# Patient Record
Sex: MALE | Race: WHITE
[De-identification: names, ages, dates, MRNs, and addresses within clinical notes are randomized per-mention and may not be internally consistent; named-entity substitution may affect disease eponyms.]

---

## 2018-03-27 ENCOUNTER — HOSPITAL ENCOUNTER (OUTPATIENT)
Dept: HOSPITAL 96 - M.ERS | Age: 53
Setting detail: OBSERVATION
LOS: 1 days | Discharge: HOME | End: 2018-03-28
Attending: SURGERY | Admitting: SURGERY
Payer: COMMERCIAL

## 2018-03-27 VITALS — WEIGHT: 165 LBS | BODY MASS INDEX: 25.01 KG/M2 | HEIGHT: 67.99 IN

## 2018-03-27 VITALS — DIASTOLIC BLOOD PRESSURE: 91 MMHG | SYSTOLIC BLOOD PRESSURE: 152 MMHG

## 2018-03-27 VITALS — DIASTOLIC BLOOD PRESSURE: 64 MMHG | SYSTOLIC BLOOD PRESSURE: 107 MMHG

## 2018-03-27 VITALS — SYSTOLIC BLOOD PRESSURE: 114 MMHG | DIASTOLIC BLOOD PRESSURE: 74 MMHG

## 2018-03-27 DIAGNOSIS — R10.9: ICD-10-CM

## 2018-03-27 DIAGNOSIS — R06.02: ICD-10-CM

## 2018-03-27 DIAGNOSIS — D72.829: ICD-10-CM

## 2018-03-27 DIAGNOSIS — R11.2: ICD-10-CM

## 2018-03-27 DIAGNOSIS — K56.51: Primary | ICD-10-CM

## 2018-03-27 DIAGNOSIS — E86.0: ICD-10-CM

## 2018-03-27 LAB
ABSOLUTE BASOPHILS: 0.1 THOU/UL (ref 0–0.2)
ABSOLUTE EOSINOPHILS: 0 THOU/UL (ref 0–0.7)
ABSOLUTE MONOCYTES: 0.7 THOU/UL (ref 0–1.2)
ALBUMIN SERPL-MCNC: 4 G/DL (ref 3.4–5)
ALP SERPL-CCNC: 79 U/L (ref 46–116)
ALT SERPL-CCNC: 36 U/L (ref 30–65)
ANION GAP SERPL CALC-SCNC: 8 MMOL/L (ref 7–16)
AST SERPL-CCNC: 23 U/L (ref 15–37)
BASOPHILS NFR BLD AUTO: 0.5 %
BILIRUB SERPL-MCNC: 0.7 MG/DL
BILIRUB UR-MCNC: NEGATIVE MG/DL
BUN SERPL-MCNC: 11 MG/DL (ref 7–18)
CALCIUM SERPL-MCNC: 9.3 MG/DL (ref 8.5–10.1)
CHLORIDE SERPL-SCNC: 104 MMOL/L (ref 98–107)
CO2 SERPL-SCNC: 30 MMOL/L (ref 21–32)
COLOR UR: (no result)
CREAT SERPL-MCNC: 1.2 MG/DL (ref 0.6–1.3)
EOSINOPHIL NFR BLD: 0.2 %
GLUCOSE SERPL-MCNC: 112 MG/DL (ref 70–99)
GRANULOCYTES NFR BLD MANUAL: 82.4 %
HCT VFR BLD CALC: 47.8 % (ref 42–52)
HGB BLD-MCNC: 17 GM/DL (ref 14–18)
KETONES UR STRIP-MCNC: (no result) MG/DL
LIPASE: 138 U/L (ref 73–393)
LYMPHOCYTES # BLD: 1.6 THOU/UL (ref 0.8–5.3)
LYMPHOCYTES NFR BLD AUTO: 11.8 %
MCH RBC QN AUTO: 36.2 PG (ref 26–34)
MCHC RBC AUTO-ENTMCNC: 35.5 G/DL (ref 28–37)
MCV RBC: 101.9 FL (ref 80–100)
MONOCYTES NFR BLD: 5.1 %
MPV: 8.5 FL. (ref 7.2–11.1)
NEUTROPHILS # BLD: 11.2 THOU/UL (ref 1.6–8.1)
NUCLEATED RBCS: 0 /100WBC
PLATELET COUNT*: 237 THOU/UL (ref 150–400)
POTASSIUM SERPL-SCNC: 3.6 MMOL/L (ref 3.5–5.1)
PROT SERPL-MCNC: 7.8 G/DL (ref 6.4–8.2)
PROT UR QL STRIP: (no result)
RBC # BLD AUTO: 4.69 MIL/UL (ref 4.5–6)
RBC # UR STRIP: NEGATIVE /UL
RDW-CV: 12.7 % (ref 10.5–14.5)
SODIUM SERPL-SCNC: 142 MMOL/L (ref 136–145)
SP GR UR STRIP: 1.01 (ref 1–1.03)
TROPONIN-I LEVEL: <0.06 NG/ML (ref ?–0.06)
URINE CLARITY: CLEAR
URINE GLUCOSE-RANDOM: NEGATIVE
URINE LEUKOCYTES-REFLEX: NEGATIVE
URINE NITRITE-REFLEX: NEGATIVE
UROBILINOGEN UR STRIP-ACNC: 0.2 E.U./DL (ref 0.2–1)
WBC # BLD AUTO: 13.6 THOU/UL (ref 4–11)

## 2018-03-27 NOTE — NUR
PATIENT ARRIVED TO UNIT AT 1800. ALERT AND ORIENTED X4. UP AD PETRA IN ROOM. IV
IS PATENT AND INFUSING. PAIN BEING MANAGED WITH MEDICATION GIVEN IN THE ER AND
SCHEDULED TORDOL. DENIES NAUSEA AT THIS TIME. PATIENT HAS BEEN ORIENTED TO
ROOM. VSS ON ROOM AIR. HOURLY ROUNDS HAVE BEEN MAINTAINED THROUGHOUT SHIFT.
CALL LIGHT IS WITHIN REACH. NURSING WILL CONTINUE TO MONITOR.

## 2018-03-28 VITALS — DIASTOLIC BLOOD PRESSURE: 66 MMHG | SYSTOLIC BLOOD PRESSURE: 111 MMHG

## 2018-03-28 VITALS — DIASTOLIC BLOOD PRESSURE: 68 MMHG | SYSTOLIC BLOOD PRESSURE: 109 MMHG

## 2018-03-28 LAB
ABSOLUTE BASOPHILS: 0.1 THOU/UL (ref 0–0.2)
ABSOLUTE EOSINOPHILS: 0.1 THOU/UL (ref 0–0.7)
ABSOLUTE MONOCYTES: 0.8 THOU/UL (ref 0–1.2)
ANION GAP SERPL CALC-SCNC: 9 MMOL/L (ref 7–16)
BASOPHILS NFR BLD AUTO: 0.9 %
BUN SERPL-MCNC: 10 MG/DL (ref 7–18)
CALCIUM SERPL-MCNC: 8.2 MG/DL (ref 8.5–10.1)
CHLORIDE SERPL-SCNC: 110 MMOL/L (ref 98–107)
CO2 SERPL-SCNC: 26 MMOL/L (ref 21–32)
CREAT SERPL-MCNC: 1.1 MG/DL (ref 0.6–1.3)
EOSINOPHIL NFR BLD: 1.1 %
GLUCOSE SERPL-MCNC: 95 MG/DL (ref 70–99)
GRANULOCYTES NFR BLD MANUAL: 51.5 %
HCT VFR BLD CALC: 40.7 % (ref 42–52)
HGB BLD-MCNC: 14.4 GM/DL (ref 14–18)
LYMPHOCYTES # BLD: 2.7 THOU/UL (ref 0.8–5.3)
LYMPHOCYTES NFR BLD AUTO: 35.9 %
MCH RBC QN AUTO: 35.8 PG (ref 26–34)
MCHC RBC AUTO-ENTMCNC: 35.3 G/DL (ref 28–37)
MCV RBC: 101.4 FL (ref 80–100)
MONOCYTES NFR BLD: 10.6 %
MPV: 8.8 FL. (ref 7.2–11.1)
NEUTROPHILS # BLD: 3.8 THOU/UL (ref 1.6–8.1)
NUCLEATED RBCS: 0 /100WBC
PLATELET COUNT*: 189 THOU/UL (ref 150–400)
POTASSIUM SERPL-SCNC: 4 MMOL/L (ref 3.5–5.1)
RBC # BLD AUTO: 4.01 MIL/UL (ref 4.5–6)
RDW-CV: 12.3 % (ref 10.5–14.5)
SODIUM SERPL-SCNC: 145 MMOL/L (ref 136–145)
WBC # BLD AUTO: 7.5 THOU/UL (ref 4–11)

## 2018-03-28 NOTE — NUR
ASSUMED CARE IF APTIENT AFTER MORNING REPORT. ALERT AND ORIENTED X4.
ASSESSMENT COMPLETED AND AS CHARTED. VSS ON ROOM AIR. PATIENT HAS HAD NO
COMPLAINTS OF PAIN OR NAUSEA THIS SHIFT. FLUIDS INFUSING AS ORDERED. PATIENT
ADVANCED FROM NPO TO CLEAR LIQUIDS AND TOLERATED THIS WELL. PATIENT IS
DISCHARGING ON A CLEAR LIQUID DIET AND IS ADVISED TO SLOWLY ADVANCE DIET AS
TOLERATED. PATIENT DISCHARGED AT 1510. ALL PERSONAL BELONGINGS LEFT WITH
PATIENT. DISCHARGE INSTRUCTION SEN WITH PATIENT UPON DISCHARGE.

## 2018-03-28 NOTE — NUR
PATIENT ALERT AND ORIENTED THROUGHOUT SHIFT. VITAL SIGNS STABLE ON ROOM AIR.
DECLINED SCD'S AND SHAI HOSE STATING HE WOULD GET UP AND WALK INSTEAD.
MAINTAINED NPO STATUS THROUGHOUT SHIFT. PATIENT DECLINED SCHEDULED PAIN
MEDICATION AT MIDNIGHT STATING HE WAS NOT HAVING PAIN AT THIS TIME. DENIED
NAUSEA AND OR VOMITING. IV PATENT IN THE RIGHT AC AND RUNNING AT 75 ML/HR.
RESTING COMFORTABLY AT THIS TIME. CALL LIGHT WITHIN REACH. WILL CALL FOR
ASSISTANCE.  NURSING WILL CONTINUE TO MONITOR.

## 2018-05-02 ENCOUNTER — HOSPITAL ENCOUNTER (INPATIENT)
Dept: HOSPITAL 96 - M.ERS | Age: 53
LOS: 1 days | Discharge: HOME | DRG: 389 | End: 2018-05-03
Attending: COLON & RECTAL SURGERY | Admitting: COLON & RECTAL SURGERY
Payer: COMMERCIAL

## 2018-05-02 VITALS — SYSTOLIC BLOOD PRESSURE: 129 MMHG | DIASTOLIC BLOOD PRESSURE: 75 MMHG

## 2018-05-02 VITALS — DIASTOLIC BLOOD PRESSURE: 83 MMHG | SYSTOLIC BLOOD PRESSURE: 122 MMHG

## 2018-05-02 VITALS — DIASTOLIC BLOOD PRESSURE: 78 MMHG | SYSTOLIC BLOOD PRESSURE: 135 MMHG

## 2018-05-02 VITALS — SYSTOLIC BLOOD PRESSURE: 130 MMHG | DIASTOLIC BLOOD PRESSURE: 73 MMHG

## 2018-05-02 VITALS — DIASTOLIC BLOOD PRESSURE: 82 MMHG | SYSTOLIC BLOOD PRESSURE: 134 MMHG

## 2018-05-02 VITALS — WEIGHT: 170 LBS | HEIGHT: 69.02 IN | BODY MASS INDEX: 25.18 KG/M2

## 2018-05-02 VITALS — SYSTOLIC BLOOD PRESSURE: 130 MMHG | DIASTOLIC BLOOD PRESSURE: 76 MMHG

## 2018-05-02 DIAGNOSIS — E44.1: ICD-10-CM

## 2018-05-02 DIAGNOSIS — Z88.8: ICD-10-CM

## 2018-05-02 DIAGNOSIS — Z79.899: ICD-10-CM

## 2018-05-02 DIAGNOSIS — K56.600: Primary | ICD-10-CM

## 2018-05-02 LAB
ABSOLUTE MONOCYTES: 0.5 THOU/UL (ref 0–1.2)
ALBUMIN SERPL-MCNC: 3.9 G/DL (ref 3.4–5)
ALP SERPL-CCNC: 74 U/L (ref 46–116)
ALT SERPL-CCNC: 24 U/L (ref 30–65)
ANION GAP SERPL CALC-SCNC: 12 MMOL/L (ref 7–16)
ANISOCYTOSIS BLD QL SMEAR: (no result)
AST SERPL-CCNC: 19 U/L (ref 15–37)
BILIRUB SERPL-MCNC: 0.7 MG/DL
BILIRUB UR-MCNC: NEGATIVE MG/DL
BUN SERPL-MCNC: 9 MG/DL (ref 7–18)
CALCIUM SERPL-MCNC: 8.9 MG/DL (ref 8.5–10.1)
CHLORIDE SERPL-SCNC: 105 MMOL/L (ref 98–107)
CO2 SERPL-SCNC: 26 MMOL/L (ref 21–32)
COLOR UR: YELLOW
CREAT SERPL-MCNC: 1.1 MG/DL (ref 0.6–1.3)
GLUCOSE SERPL-MCNC: 135 MG/DL (ref 70–99)
GRANULOCYTES NFR BLD MANUAL: 84 %
HCT VFR BLD CALC: 46.2 % (ref 42–52)
HGB BLD-MCNC: 16.2 GM/DL (ref 14–18)
KETONES UR STRIP-MCNC: (no result) MG/DL
LIPASE: 114 U/L (ref 73–393)
LYMPHOCYTES # BLD: 1 THOU/UL (ref 0.8–5.3)
LYMPHOCYTES NFR BLD AUTO: 11 %
MCH RBC QN AUTO: 35.3 PG (ref 26–34)
MCHC RBC AUTO-ENTMCNC: 35 G/DL (ref 28–37)
MCV RBC: 101 FL (ref 80–100)
MONOCYTES NFR BLD: 5 %
MPV: 8.6 FL. (ref 7.2–11.1)
NEUTROPHILS # BLD: 8 THOU/UL (ref 1.6–8.1)
NUCLEATED RBCS: 0 /100WBC
PLATELET COUNT*: 195 THOU/UL (ref 150–400)
POIKILOCYTOSIS BLD QL SMEAR: (no result)
POTASSIUM SERPL-SCNC: 3.7 MMOL/L (ref 3.5–5.1)
PROT SERPL-MCNC: 7.6 G/DL (ref 6.4–8.2)
PROT UR QL STRIP: NEGATIVE
RBC # BLD AUTO: 4.57 MIL/UL (ref 4.5–6)
RBC # UR STRIP: (no result) /UL
RDW-CV: 12.3 % (ref 10.5–14.5)
SODIUM SERPL-SCNC: 143 MMOL/L (ref 136–145)
SP GR UR STRIP: 1.01 (ref 1–1.03)
TROPONIN-I LEVEL: <0.06 NG/ML (ref ?–0.06)
URINE CLARITY: CLEAR
URINE GLUCOSE-RANDOM: NEGATIVE
URINE LEUKOCYTES-REFLEX: NEGATIVE
URINE NITRITE-REFLEX: NEGATIVE
UROBILINOGEN UR STRIP-ACNC: 0.2 E.U./DL (ref 0.2–1)
WBC # BLD AUTO: 9.5 THOU/UL (ref 4–11)

## 2018-05-02 NOTE — NUR
MET WITH PT TO DISCUSS HOME SITUATION/DC PLANNING. PT LIVES ALONE. HAS
SUPPORTIVE FAMILY. HE IS NORMALLY INDEPENDENT AND ACTIVE, WORKS OUTSIDE THE
HOME.  DENIES ANY DC NEEDS AT THIS TIME.  WILL FOLLOW

## 2018-05-02 NOTE — EKG
[place] [address]
Phone:  [phone]                     ELECTROCARDIOGRAM REPORT      
_______________________________________________________________________________
 
Name:       KINJALFIOR BETANCOURT                 Room:           [address] [place]  
[facility]#:  [id number]      Account #:      [id number]  
Admission:  18     Attend Phys:    Humaira Franklin MD    
Discharge:               Date of Birth:  [birth year]  
         Report #: [id number]
    [id number]
_______________________________________________________________________________
THIS REPORT FOR:  //name//                      
 
                         [facility] ED
                                       
Test Date:    2018               Test Time:    04:55:45
Pat Name:     FIOR LAYTON                Department:   
Patient ID:   [id number]            Room:          
Gender:                               Technician:   [facility]
:          1965               Requested By: Scot Rodríguez
Order Number: [id number] MD:   Shilo Parks
                                 Measurements
Intervals                              Axis          
Rate:         81                       P:            16
AR:           159                      QRS:          1
QRSD:         104                      T:            50
QT:           368                                    
QTc:          428                                    
                           Interpretive Statements
Sinus rhythm
ST elev, probable normal early repol pattern
Compared to ECG 2017 07:53:31
no change
 
Electronically Signed On 2018 11:13:04 CDT by Shilo Parks
[email or web address]
 
 
 
 
 
 
 
 
 
 
 
 
 
 
 
 
 
 
  <ELECTRONICALLY SIGNED>
                                           By: Shilo Parks MD, [facility]      
  18     1113
D: 18 0455   _____________________________________
T: 18 0455   Shilo Parks MD, [facility]        /EPI

## 2018-05-03 VITALS — SYSTOLIC BLOOD PRESSURE: 122 MMHG | DIASTOLIC BLOOD PRESSURE: 88 MMHG

## 2018-05-03 VITALS — DIASTOLIC BLOOD PRESSURE: 72 MMHG | SYSTOLIC BLOOD PRESSURE: 130 MMHG

## 2018-05-03 VITALS — DIASTOLIC BLOOD PRESSURE: 88 MMHG | SYSTOLIC BLOOD PRESSURE: 122 MMHG

## 2018-05-03 LAB
ALBUMIN SERPL-MCNC: 3 G/DL (ref 3.4–5)
ALP SERPL-CCNC: 55 U/L (ref 46–116)
ALT SERPL-CCNC: 16 U/L (ref 30–65)
ANION GAP SERPL CALC-SCNC: 7 MMOL/L (ref 7–16)
AST SERPL-CCNC: 14 U/L (ref 15–37)
BILIRUB SERPL-MCNC: 0.7 MG/DL
BUN SERPL-MCNC: 8 MG/DL (ref 7–18)
CALCIUM SERPL-MCNC: 8.1 MG/DL (ref 8.5–10.1)
CHLORIDE SERPL-SCNC: 109 MMOL/L (ref 98–107)
CO2 SERPL-SCNC: 28 MMOL/L (ref 21–32)
CREAT SERPL-MCNC: 1.1 MG/DL (ref 0.6–1.3)
GLUCOSE SERPL-MCNC: 99 MG/DL (ref 70–99)
HCT VFR BLD CALC: 39.5 % (ref 42–52)
HGB BLD-MCNC: 13.7 GM/DL (ref 14–18)
MAGNESIUM SERPL-MCNC: 2 MG/DL (ref 1.8–2.4)
MCH RBC QN AUTO: 35.6 PG (ref 26–34)
MCHC RBC AUTO-ENTMCNC: 34.7 G/DL (ref 28–37)
MCV RBC: 102.7 FL (ref 80–100)
MPV: 9 FL. (ref 7.2–11.1)
PHOSPHATE SERPL-MCNC: 3 MG/DL (ref 2.5–4.9)
PLATELET COUNT*: 169 THOU/UL (ref 150–400)
POTASSIUM SERPL-SCNC: 4 MMOL/L (ref 3.5–5.1)
PROT SERPL-MCNC: 5.7 G/DL (ref 6.4–8.2)
RBC # BLD AUTO: 3.84 MIL/UL (ref 4.5–6)
RDW-CV: 12.3 % (ref 10.5–14.5)
SODIUM SERPL-SCNC: 144 MMOL/L (ref 136–145)
WBC # BLD AUTO: 5.9 THOU/UL (ref 4–11)

## 2018-05-03 NOTE — NUR
PATIENT ALERT AND ORIENTED. DENIES PAIN, NAUSEA OR ANY DISCOMFORT. ALL
DISCHARGE QUESTIONS ANSWERED BY PREVIOUS SHIFT. PATIENT WALKED HIMSELF OUT AND
IS DISCHARGING HOME.

## 2018-05-03 NOTE — NUR
PATIENT HAS SLEPT WELL THROUGHOUT THE NIGHT WITHOUT ANY ISSUES. N0 C/O PAIN.
VSS ON RA. PATIENT HAS REMAINED NPO. PATIENT IS UP AD-PETRA AND STEADY. IV IN
LEFT AC-D5 1/2 NS @ 125ML/HR. PATIENT INSTRUCTED TO USE CALL LIGHT WHEN
NEEDING ASSISTANCE. HOURLY ROUNDS MADE. WILL CONTINUE WITH PLAN OF CARE AND
NURSING TO MONITOR.

## 2018-05-03 NOTE — NUR
ASSUMED CARE OF PATIENT AT 1730. AGREE WITH PREVIOUS NURSES CHARTING.  ALERT
AND ORIENTED X4. UP AD PETRA IN ROOM. IV DC'D. DISCHARGE INSTRUCTIONS WENT OVER
WITH PATIENT AND ALL QUESTIONS ANSWERED. VSS ON ROOM AIR. HOURLY ROUNDS HAVE
BEEN MAINTAINED SINCE ASSUMING CARE. CALL LIGHT IS WITHIN REACH. NURSING WILL
CONTINUE TO MONITOR UNITIL DC.

## 2018-05-03 NOTE — NUR
ASSUMED CARE OF PATIENT AFTER MORNING REPORT. ALERT AND ORIENTED X4.
ASSESSMENT COMPLETED AND AS CHARTED. VSS ON ROOM AIR. PATINT HAS HAD NO
COMPLAINTS OF NAUSEA OR SOA THIS SHIFT. PATIENT HAD A HEADACHE AND WAS GIVEN
IBUPROFEN. PATIENT COMPLETED A SMALL BOWEL FOLLOW THROUGH STUDY AND IS CLEARED
FOR DISCHARGE AS SOON AS HE TOLERATES SOFT FOODS. PATIENT IS CURRENTLY HAVING
LIQUID STOOLS. DR SANON ORDERED AN INCREASE IN IV FLUIDS AND IS OK WITH
PATIENT DISCHARING HOME AFTER DINNER THIS EVENING. HOURLY ROUNDS MAINTAINED,
CALL LIGHT WITHIN REACH AND NURSING WILL CONTINUE TO MONITOR.

## 2019-06-27 ENCOUNTER — HOSPITAL ENCOUNTER (INPATIENT)
Dept: HOSPITAL 96 - M.ERS | Age: 54
LOS: 2 days | Discharge: HOME | DRG: 390 | End: 2019-06-29
Attending: SURGERY | Admitting: SURGERY
Payer: COMMERCIAL

## 2019-06-27 VITALS — SYSTOLIC BLOOD PRESSURE: 113 MMHG | DIASTOLIC BLOOD PRESSURE: 69 MMHG

## 2019-06-27 VITALS — SYSTOLIC BLOOD PRESSURE: 126 MMHG | DIASTOLIC BLOOD PRESSURE: 74 MMHG

## 2019-06-27 VITALS — SYSTOLIC BLOOD PRESSURE: 114 MMHG | DIASTOLIC BLOOD PRESSURE: 69 MMHG

## 2019-06-27 VITALS — HEIGHT: 69 IN | BODY MASS INDEX: 22.96 KG/M2 | WEIGHT: 155 LBS

## 2019-06-27 VITALS — DIASTOLIC BLOOD PRESSURE: 84 MMHG | SYSTOLIC BLOOD PRESSURE: 126 MMHG

## 2019-06-27 VITALS — SYSTOLIC BLOOD PRESSURE: 110 MMHG | DIASTOLIC BLOOD PRESSURE: 67 MMHG

## 2019-06-27 DIAGNOSIS — K56.600: Primary | ICD-10-CM

## 2019-06-27 DIAGNOSIS — Z88.8: ICD-10-CM

## 2019-06-27 DIAGNOSIS — Z83.6: ICD-10-CM

## 2019-06-27 DIAGNOSIS — Z79.899: ICD-10-CM

## 2019-06-27 DIAGNOSIS — Z82.3: ICD-10-CM

## 2019-06-27 DIAGNOSIS — Z83.49: ICD-10-CM

## 2019-06-27 LAB
ABSOLUTE BASOPHILS: 0 THOU/UL (ref 0–0.2)
ABSOLUTE EOSINOPHILS: 0 THOU/UL (ref 0–0.7)
ABSOLUTE MONOCYTES: 0.5 THOU/UL (ref 0–1.2)
ALBUMIN SERPL-MCNC: 4.3 G/DL (ref 3.4–5)
ALP SERPL-CCNC: 82 U/L (ref 46–116)
ALT SERPL-CCNC: 28 U/L (ref 30–65)
ANION GAP SERPL CALC-SCNC: 14 MMOL/L (ref 7–16)
AST SERPL-CCNC: 21 U/L (ref 15–37)
BACTERIA-REFLEX: (no result) /HPF
BASOPHILS NFR BLD AUTO: 0.3 %
BILIRUB SERPL-MCNC: 1.3 MG/DL
BILIRUB UR-MCNC: NEGATIVE MG/DL
BUN SERPL-MCNC: 13 MG/DL (ref 7–18)
CALCIUM SERPL-MCNC: 9.3 MG/DL (ref 8.5–10.1)
CHLORIDE SERPL-SCNC: 101 MMOL/L (ref 98–107)
CO2 SERPL-SCNC: 26 MMOL/L (ref 21–32)
COLOR UR: YELLOW
CREAT SERPL-MCNC: 1.3 MG/DL (ref 0.6–1.3)
EOSINOPHIL NFR BLD: 0.2 %
GLUCOSE SERPL-MCNC: 119 MG/DL (ref 70–99)
GRANULOCYTES NFR BLD MANUAL: 79.4 %
HCT VFR BLD CALC: 47.7 % (ref 42–52)
HGB BLD-MCNC: 17 GM/DL (ref 14–18)
KETONES UR STRIP-MCNC: (no result) MG/DL
LIPASE: 133 U/L (ref 73–393)
LYMPHOCYTES # BLD: 1.1 THOU/UL (ref 0.8–5.3)
LYMPHOCYTES NFR BLD AUTO: 13.6 %
MCH RBC QN AUTO: 35.8 PG (ref 26–34)
MCHC RBC AUTO-ENTMCNC: 35.7 G/DL (ref 28–37)
MCV RBC: 100.5 FL (ref 80–100)
MONOCYTES NFR BLD: 6.5 %
MPV: 8.6 FL. (ref 7.2–11.1)
NEUTROPHILS # BLD: 6.7 THOU/UL (ref 1.6–8.1)
NUCLEATED RBCS: 0 /100WBC
PLATELET COUNT*: 248 THOU/UL (ref 150–400)
POTASSIUM SERPL-SCNC: 3.8 MMOL/L (ref 3.5–5.1)
PROT SERPL-MCNC: 8.4 G/DL (ref 6.4–8.2)
PROT UR QL STRIP: NEGATIVE
RBC # BLD AUTO: 4.74 MIL/UL (ref 4.5–6)
RBC # UR STRIP: (no result) /UL
RBC #/AREA URNS HPF: (no result) /HPF (ref 0–2)
RDW-CV: 12.6 % (ref 10.5–14.5)
SODIUM SERPL-SCNC: 141 MMOL/L (ref 136–145)
SP GR UR STRIP: <= 1.005 (ref 1–1.03)
SQUAMOUS: (no result) /LPF (ref 0–3)
URINE CLARITY: CLEAR
URINE GLUCOSE-RANDOM: NEGATIVE
URINE LEUKOCYTES-REFLEX: NEGATIVE
URINE NITRITE-REFLEX: NEGATIVE
URINE WBC-REFLEX: (no result) /HPF (ref 0–5)
UROBILINOGEN UR STRIP-ACNC: 0.2 E.U./DL (ref 0.2–1)
WBC # BLD AUTO: 8.4 THOU/UL (ref 4–11)

## 2019-06-27 NOTE — NUR
ADMISSION. PATIENT REC'D FROM ER PER WC TO . NOTED FAMILY MEMBERS
PRESENT. PATIENT STATES HAVING NAUSEA AND VOMITING W/ ABD PAIN. STATES THAT IT
USUALLY RESOLVES ITSELF, BUT WAS VOMITING LAST NOC AND CAME IN. PATIENT STATES
HAVING SM BOWEL RESECTION IN 2017 AND HX OF ABD ADHESIONS. PATIENT STATES
HAVING SM HARD SM YESTERDAY, NO DIARRHEA. IV SITE NOTES WNL, LR @100/HR
INFUSING W/O DIFF. SITE FLUSHES EASILY. ADMISSION REVIEW AND POC DONE WITH
PATIENT, PATIENT STATES VERBALLY OF UNDERSTANDING. PATIENT AMB W/ STEADY GAIT,
EDUCATED ON CALL LIGHT WHEN NEEDED. PATIENT UP AMB IN HALLS W/ STEADY GAIT.
ALERT, ORIENTED, PLEASANT AND COOPERATIVE. ~TJRN

## 2019-06-27 NOTE — NUR
JUANITA NOTIFIED UPON PT RETURN FROM CT. PT CONNECTED TO PULSE OX MONITOR AS HE
WAS PRIOR TO GOING TO CT

## 2019-06-28 VITALS — DIASTOLIC BLOOD PRESSURE: 67 MMHG | SYSTOLIC BLOOD PRESSURE: 112 MMHG

## 2019-06-28 VITALS — DIASTOLIC BLOOD PRESSURE: 71 MMHG | SYSTOLIC BLOOD PRESSURE: 113 MMHG

## 2019-06-28 VITALS — DIASTOLIC BLOOD PRESSURE: 84 MMHG | SYSTOLIC BLOOD PRESSURE: 135 MMHG

## 2019-06-28 LAB
ABSOLUTE BASOPHILS: 0 THOU/UL (ref 0–0.2)
ABSOLUTE EOSINOPHILS: 0.1 THOU/UL (ref 0–0.7)
ABSOLUTE MONOCYTES: 0.7 THOU/UL (ref 0–1.2)
ANION GAP SERPL CALC-SCNC: 10 MMOL/L (ref 7–16)
BASOPHILS NFR BLD AUTO: 0.7 %
BUN SERPL-MCNC: 14 MG/DL (ref 7–18)
CALCIUM SERPL-MCNC: 8 MG/DL (ref 8.5–10.1)
CHLORIDE SERPL-SCNC: 109 MMOL/L (ref 98–107)
CO2 SERPL-SCNC: 26 MMOL/L (ref 21–32)
CREAT SERPL-MCNC: 0.9 MG/DL (ref 0.6–1.3)
EOSINOPHIL NFR BLD: 1.8 %
GLUCOSE SERPL-MCNC: 64 MG/DL (ref 70–99)
GRANULOCYTES NFR BLD MANUAL: 52.3 %
HCT VFR BLD CALC: 38.9 % (ref 42–52)
HGB BLD-MCNC: 13.3 GM/DL (ref 14–18)
LYMPHOCYTES # BLD: 2.2 THOU/UL (ref 0.8–5.3)
LYMPHOCYTES NFR BLD AUTO: 34.5 %
MAGNESIUM SERPL-MCNC: 1.9 MG/DL (ref 1.8–2.4)
MCH RBC QN AUTO: 35.1 PG (ref 26–34)
MCHC RBC AUTO-ENTMCNC: 34.3 G/DL (ref 28–37)
MCV RBC: 102.6 FL (ref 80–100)
MONOCYTES NFR BLD: 10.7 %
MPV: 8.4 FL. (ref 7.2–11.1)
NEUTROPHILS # BLD: 3.4 THOU/UL (ref 1.6–8.1)
NUCLEATED RBCS: 0 /100WBC
PLATELET COUNT*: 194 THOU/UL (ref 150–400)
POTASSIUM SERPL-SCNC: 3.7 MMOL/L (ref 3.5–5.1)
RBC # BLD AUTO: 3.79 MIL/UL (ref 4.5–6)
RDW-CV: 12.6 % (ref 10.5–14.5)
SODIUM SERPL-SCNC: 145 MMOL/L (ref 136–145)
WBC # BLD AUTO: 6.5 THOU/UL (ref 4–11)

## 2019-06-28 NOTE — NUR
PT ALERT AND ORIENTED. VITALS STABLE RA. PT DENIED PAIN. NO NAUSEA OR VOMITING
THIS SHIFT. PT WALKED DOWN THE HALLWAY TWICE. NPO, FLUID RUNNING AS ORDERED.
HOURLY ROUNDING COMPLETED. WILL CONTINUE TO MONITOR.

## 2019-06-29 VITALS — SYSTOLIC BLOOD PRESSURE: 127 MMHG | DIASTOLIC BLOOD PRESSURE: 73 MMHG

## 2019-06-29 VITALS — DIASTOLIC BLOOD PRESSURE: 73 MMHG | SYSTOLIC BLOOD PRESSURE: 127 MMHG

## 2019-06-29 NOTE — NUR
PATIENT DISCHARGED FROM UNIT AT 1300. ALERT AND ORIENTED X 4. VITAL SIGNS
STABLE ON ROOM AIR. UP INDEPENDENTLY AND AMBULATING IN HALLWAY. IV
DISCONTINUED. DENIES PAIN AND NAUSEA. DISCHARGE INSTRUCTIONS AND MEDICATION
INFORMATION GIVEN TO PATIENT. LEFT WITH ALL BELONGINGS. PATIENT LEFT WITH
FATHER VIA CAR.

## 2019-06-29 NOTE — NUR
ASSESSMENT:
PT REMAINS ALERT AND ORIENT TIMES FOUR. UP  AD PETRA, WALKS IN CORRIDOR. DENIES
PAIN, NAUSEA, SOB AND VOMITING. LR INFUSING WITHOUT DIFFICULT. POSSIBLE DC TO
HOME TODAY. VSS, AFEBRILE. GOOD PROGRESS TOWARDS DC GOALS. WILL CONTINUE TO
MONITOR.

## 2019-08-28 ENCOUNTER — HOSPITAL ENCOUNTER (INPATIENT)
Dept: HOSPITAL 96 - M.SUR | Age: 54
LOS: 3 days | Discharge: HOME | DRG: 337 | End: 2019-08-31
Attending: SURGERY | Admitting: SURGERY
Payer: COMMERCIAL

## 2019-08-28 VITALS — BODY MASS INDEX: 22.43 KG/M2 | HEIGHT: 68 IN | WEIGHT: 148 LBS

## 2019-08-28 VITALS — DIASTOLIC BLOOD PRESSURE: 67 MMHG | SYSTOLIC BLOOD PRESSURE: 123 MMHG

## 2019-08-28 VITALS — DIASTOLIC BLOOD PRESSURE: 64 MMHG | SYSTOLIC BLOOD PRESSURE: 113 MMHG

## 2019-08-28 VITALS — SYSTOLIC BLOOD PRESSURE: 99 MMHG | DIASTOLIC BLOOD PRESSURE: 63 MMHG

## 2019-08-28 DIAGNOSIS — Z79.899: ICD-10-CM

## 2019-08-28 DIAGNOSIS — K56.50: Primary | ICD-10-CM

## 2019-08-28 DIAGNOSIS — Z88.8: ICD-10-CM

## 2019-08-28 PROCEDURE — 0DNU4ZZ RELEASE OMENTUM, PERCUTANEOUS ENDOSCOPIC APPROACH: ICD-10-PCS | Performed by: SURGERY

## 2019-08-28 NOTE — NUR
PT REMAINED ALERT AND ORIENTED. PT RESTING IN ROOM. PT DENIES PAIN AT THIS
TIME. FALL RISK PRECAUTIONS IN PLACE. HOURLY ROUNDING COMPLETED. WILL CONTINUE
TO MONITOR.

## 2019-08-29 VITALS — DIASTOLIC BLOOD PRESSURE: 50 MMHG | SYSTOLIC BLOOD PRESSURE: 109 MMHG

## 2019-08-29 VITALS — SYSTOLIC BLOOD PRESSURE: 99 MMHG | DIASTOLIC BLOOD PRESSURE: 56 MMHG

## 2019-08-29 VITALS — SYSTOLIC BLOOD PRESSURE: 120 MMHG | DIASTOLIC BLOOD PRESSURE: 75 MMHG

## 2019-08-29 VITALS — DIASTOLIC BLOOD PRESSURE: 81 MMHG | SYSTOLIC BLOOD PRESSURE: 126 MMHG

## 2019-08-29 NOTE — NUR
PT ALERT AND ORIENTED, VSS RA. LAP SITE DRESSINGS C/D/I. MEDS GIVEN AS
ORDERED.  PAIN MANAGED WITH SCHEDULED TYLENOL. NO N/V THIS SHIFT. PT
RESTING/SLEEPING QUIETLY ON HOURLY ROUNDINGS.  WILL CONTINUE TO MONITOR.

## 2019-08-29 NOTE — NUR
PATIENT PLEASANT AND COOPERATIVE THRU SHIFT. SEE MAR. STATES +GAS. AMB IN
HALLS INDEP W/ STEADY GAIT. FAMILY MEMBERS IN PERIODICALLY THRU SHIFT. PATIENT
CURRENTLY SITTING UP IN CHAIR. DENIES NURSING NEEDS AT THIS TIME. IV CATH SITE
WNL. DRSGS TO SURG SITES NOTED WNL.

## 2019-08-30 VITALS — SYSTOLIC BLOOD PRESSURE: 139 MMHG | DIASTOLIC BLOOD PRESSURE: 79 MMHG

## 2019-08-30 VITALS — SYSTOLIC BLOOD PRESSURE: 154 MMHG | DIASTOLIC BLOOD PRESSURE: 87 MMHG

## 2019-08-30 VITALS — SYSTOLIC BLOOD PRESSURE: 124 MMHG | DIASTOLIC BLOOD PRESSURE: 76 MMHG

## 2019-08-30 VITALS — DIASTOLIC BLOOD PRESSURE: 84 MMHG | SYSTOLIC BLOOD PRESSURE: 131 MMHG

## 2019-08-30 LAB
ANION GAP SERPL CALC-SCNC: 8 MMOL/L (ref 7–16)
BUN SERPL-MCNC: 9 MG/DL (ref 7–18)
CALCIUM SERPL-MCNC: 8.2 MG/DL (ref 8.5–10.1)
CHLORIDE SERPL-SCNC: 107 MMOL/L (ref 98–107)
CO2 SERPL-SCNC: 26 MMOL/L (ref 21–32)
CREAT SERPL-MCNC: 0.9 MG/DL (ref 0.6–1.3)
GLUCOSE SERPL-MCNC: 81 MG/DL (ref 70–99)
HCT VFR BLD CALC: 38.7 % (ref 42–52)
HGB BLD-MCNC: 13.3 GM/DL (ref 14–18)
MAGNESIUM SERPL-MCNC: 1.9 MG/DL (ref 1.8–2.4)
MCH RBC QN AUTO: 35.4 PG (ref 26–34)
MCHC RBC AUTO-ENTMCNC: 34.4 G/DL (ref 28–37)
MCV RBC: 103 FL (ref 80–100)
MPV: 8.3 FL. (ref 7.2–11.1)
PHOSPHATE SERPL-MCNC: 1.9 MG/DL (ref 2.5–4.9)
PLATELET COUNT*: 175 THOU/UL (ref 150–400)
POTASSIUM SERPL-SCNC: 3.9 MMOL/L (ref 3.5–5.1)
RBC # BLD AUTO: 3.76 MIL/UL (ref 4.5–6)
RDW-CV: 12.3 % (ref 10.5–14.5)
SODIUM SERPL-SCNC: 141 MMOL/L (ref 136–145)
WBC # BLD AUTO: 8.3 THOU/UL (ref 4–11)

## 2019-08-30 NOTE — NUR
PT.LIVES ALONE. HIS PARENTS AND EXT FAMILY ARE SUPPORTIVE. PT.IS INDEPENDENT
AND WORKS OUTSIDE THE HOME. NO USE OF DME OR HH HX. HE WALKS FREQUENTLY IN THE
HALLS AND GAIT IS STEADY. PLAN IS TO ADVANCE DIET THIS LAMIN AND HOME TOMORROW
IF [place].DIET.

## 2019-08-30 NOTE — NUR
PT A&O X4, VSS RA. MEDS GIVEN AS ORDERED. PT DENIED N/V. PAIN MANAGED BY
SCHEDULED TYLENOL. PT SLEEPING THROUGH THE NIGHT. NO BM THIS SHIFT. HOURLY
ROUNDING COMPLETED. WILL CONTINUE TO MONITOR.

## 2019-08-30 NOTE — NUR
ASSUMED CARE OF PATIENT AT 1620. RECEIVED REPORT FROM CLAY TIRADO. AGREE WITH
PREVIOUS ASSESSMENT AND CHARTING.

## 2019-08-30 NOTE — NUR
Nutrition: Pt admitted with abd pain. Has partial SBO. Feeling better, diet
advanced to Full liquids now. +Flatus, no BM. No nausea. Wt: 148#. Meds, PMHx,
labs noted. Low risk at this time. GOALS: continued tolerance of diet
advancements.

## 2019-08-30 NOTE — NUR
PT A&Ox4. VITALS STABLE. IV PATENT. TOLERATING FULL LIQUID DIET, ADVANCE TO
REGULAR DIET FOR DINNER. BM DURING SHIFT. AMBULATING IN ROSALES, UP AD PETRA. PAIN
CONTROLLED WITH TYLENOL. DENIED N/V. POSSIBLE DC TOMORROW. DRESSING SITES
C/D/I. CALL LIGHT WITHIN REACH. REPORT PASSED ON TO CARTER.

## 2019-08-30 NOTE — OP
[address]  [address]                    OPERATIVE REPORT              
_______________________________________________________________________________
 
Name:       FIOR LAYTON RUBENIL                 Room:           [address] [place]  
M.R.#:  [id number]      Account #:      [id number]  
Admission:  08/29/19     Attend Phys:    Doris Hernandez DO
Discharge:               Date of Birth:  [birth year]  
         Report #: [id number]
                                                                     [id number]  
_______________________________________________________________________________
THIS REPORT FOR:  //name//                      
 
CC: Eduarda Hernandez
 
DICTATED BY: Nathan Berger DO
 
DATE OF SERVICE:  08/28/2019
 
 
PREOPERATIVE DIAGNOSIS:  Recurrent small-bowel obstruction.
 
POSTOPERATIVE DIAGNOSES:  Dense adhesions along the entire left side of the
abdomen, omentum adhesed to the anterior abdominal wall, previous anastomosis
appeared patent.
 
SURGEON:  Doris Hernandez DO
 
CO-SURGEON:  Nathan Berger, PGY4
 
ASSISTANT:  Kris Capone, PGY-3
 
OPERATION PERFORMED:  Diagnostic laparoscopy and lysis of adhesions greater than
2 hours.
 
ANESTHESIA:  General and local.
 
ESTIMATED BLOOD LOSS:  5 mL.
 
SPECIMENS:  None.
 
COMPLICATIONS:  None.
 
DISPOSITION:  Stable to PACU to floor.
 
INDICATIONS FOR PROCEDURE:  The patient is a 54-year-old male with history of
exploratory laparotomy, small bowel resection and primary anastomosis.  He has
had multiple subsequent admissions for partial small-bowel obstruction that
resolved with conservative treatment using nasogastric decompression.  He has
chronic constipation as well and is on a home regimen of MiraLax and Colace and
frequently requires magnesium citrate to have a bowel movement.  He was informed
of the risks and benefits of diagnostic laparoscopy with possible lysis of
adhesions, possible small bowel resection and possible ostomy.  He understood
and decided to proceed with surgery.
 
TECHNIQUE:  After informed consent was obtained, the patient was brought to the
 
 
 
[place] [address]                    OPERATIVE REPORT              
_______________________________________________________________________________
 
Name:       FIOR LAYTON ASIA                 Room:           [address] [place]  
M.R.#:  [id number]      Account #:      [id number]  
Admission:  08/29/19     Attend Phys:    Doris Hernandez DO
Discharge:               Date of Birth:  [birth year]  
         Report #: [id number]
                                                                     [id number]  
_______________________________________________________________________________
operating room and placed in the supine position.  SCDs were on and running. 
Preoperative antibiotics were delivered.  General anesthesia was administered
with an ET tube.  The patient was prepped and draped in the usual sterile
fashion with his arms tucked to the sides.  A surgical pause was held to confirm
proper patient and procedure.  Marcaine 0.5% was injected in the previous
exploratory laparotomy incision superior to the umbilicus.  An 11 blade was used
to incise the skin an approximate 2 cm in length through the old incision.  S
retractors were used to bluntly dissect until the fascia was identified.  Dense
scar tissue overlying the fascia was taken down using cautery.  The fascia was
elevated with 2 Kochers and incised using cautery.  Larisa was used to bluntly
enter the peritoneum.  0 Vicryl was used to place a stay suture on either side
of the fascia.  The Claribel port was introduced into the abdomen and secured with
the stay sutures.  The abdomen was insufflated.  A camera was introduced into
the abdomen and exploration of the abdomen was undertaken.  The omentum was
adherent to the anterior abdominal wall, limiting the view of the abdomen as it
was draped over the trocar and the camera.  There was a window in the omentum
that allowed for port placement in the right lower quadrant, so a 5 mm trocar
was placed under direct visualization.  An additional right lower quadrant 5 mm
port was placed superior to the previous one under direct visualization.  The
camera was placed through the right lower quadrant and hook cautery was used to
take down the omental adhesions, freeing up the Claribel supraumbilical trocar for
better visibility and use.  Once all of these adhesions were taken down the
camera was returned to the umbilical port.  Further exploration of the abdomen
was undertaken.  There were visible adhesions to the small bowel and the colon. 
These tethered both small bowel to small bowel and small bowel to colon.  The
previous small bowel resection and anastomosis was readily visible and there was
no transition point noted at the anastomosis.  It appeared widely patent with no
abnormalities proximal or distal.  The majority of the adhesions were in the
left lower quadrant and left upper quadrant and extensive careful lysis of
adhesions was then begun.  Laparoscopic Babcocks were used to elevate the bowel
and laparoscopic scissors were used to carefully take down the dense adhesions
with care to avoid serosal injury to the bowel.  This was carried on for over 2
hours until all of the adhesions in the left lower quadrant and left upper
quadrant were free.  The bowel was run proximally and distally multiple times
ensuring a complete lysis of adhesions.  Throughout the length of the small
bowel, there was no clear transition point; however, there were multiple points
primarily in the left abdomen that had multiple loops of small bowel adhesed and
tethered which improved after lysis.  In the groin, there were questionable
bilateral inguinal hernias, but no bowel was seen entering the hernia defects. 
During lysis in the left upper quadrant some arterial bleeding from the
mesentery was encountered.  This was controlled using cautery and laparoscopic 5
mm clip applier.  This was hemostatic.  The suction  was used to
suction the area and ensure hemostasis, which was present.  After the bowel had
been completely run once more and all adhesions were adequately lysed, the
omentum was placed back over the entirety of the abdomen.  The ports were
removed under direct visualization.  The Claribel port was removed.  The previous
 
 
 
[facility] [address]  [address]                    OPERATIVE REPORT              
_______________________________________________________________________________
 
Name:       FIOR LAYTON                 Room:           [address] [place]  
M.R.#:  [id number]      Account #:      [id number]  
Admission:  08/29/19     Attend Phys:    Doris Hernandez DO
Discharge:               Date of Birth:  [birth year]  
         Report #: [id number]
                                                                     [id number]  
_______________________________________________________________________________
stay sutures were replaced with Kocher's and a single figure-of-eight using 0
Vicryl was used to close the fascia.  This wound was closed in layered fashion
using 3-0 Vicryl and 4-0 Monocryl.  The skin was closed at the other 5 mm port
sites using 4-0 Monocryl.  Wounds were cleansed and dressed with Mastisol,
Steri-Strips, 4 x 4's, and Tegaderms.  The patient tolerated the procedure well.
 All counts were correct.  The patient was emerged from anesthesia and
transferred to the PACU in stable condition with plans to be admitted for
observation.
 
 
 
 
 
 
 
 
 
 
 
 
 
 
 
 
 
 
 
 
 
 
 
 
 
 
 
 
 
 
 
 
 
 
 
 
<ELECTRONICALLY SIGNED>
                                        By:  Doris Hernandez DO         
08/30/19     1454
D: 08/28/19 1204_______________________________________
T: 08/28/19 1255Csim Hernandez DO            /nt

## 2019-08-31 VITALS — DIASTOLIC BLOOD PRESSURE: 76 MMHG | SYSTOLIC BLOOD PRESSURE: 124 MMHG

## 2019-08-31 VITALS — DIASTOLIC BLOOD PRESSURE: 88 MMHG | SYSTOLIC BLOOD PRESSURE: 141 MMHG

## 2019-08-31 NOTE — NUR
DISCHARGE: DISCHARGE INSTRUCTIONS REVIEWED W/ PATIENT, PATIENT STATES VERBALLY
OF UNDERSTANDING. IV SL DISCONTINUED, CATH TIP INTACT. COTTON BALL/TAPE
APPLIED TO SITE. PATIENT DENIES PAIN, N/V, AT THIS TIME. FATHER PRESENT IN RM.
BELONGINGS GATHERED PER PATIENT. BELONGINGS W/ FATHER AT TIME OF DISCHARGE.
PATIENT DRESSED INDEP. ESCORTED TO AWAITING VEHICLE PER PEDIS W/ STEADY GAIT.
DENIES OTHER NURSING NEEDS AT THIS TIME. ALERT AND ORIENTED, NO ACUTE DISTRESS
NOTED. ~TJRN

## 2019-08-31 NOTE — NUR
PT A&O X4, VSS RA. MEDS GIVEN AS ORDERED. SHOULDER PAIN CONTROLLED BY
SCHEDULED TYLENOL. LAP SITE DRESSING C/D/I. NO N/V. PT SLEEPING THROUGH
THE NIGHT ON HOURLY ROUNDINGS. WILL CONTINUE TO MONITOR.